# Patient Record
Sex: MALE | Race: WHITE | NOT HISPANIC OR LATINO
[De-identification: names, ages, dates, MRNs, and addresses within clinical notes are randomized per-mention and may not be internally consistent; named-entity substitution may affect disease eponyms.]

---

## 2017-01-23 ENCOUNTER — APPOINTMENT (OUTPATIENT)
Dept: SURGERY | Facility: CLINIC | Age: 67
End: 2017-01-23

## 2017-01-23 VITALS
BODY MASS INDEX: 25.77 KG/M2 | SYSTOLIC BLOOD PRESSURE: 130 MMHG | HEIGHT: 70 IN | HEART RATE: 71 BPM | TEMPERATURE: 98.2 F | DIASTOLIC BLOOD PRESSURE: 79 MMHG | WEIGHT: 180 LBS

## 2023-08-07 ENCOUNTER — NON-APPOINTMENT (OUTPATIENT)
Age: 73
End: 2023-08-07

## 2023-08-09 ENCOUNTER — APPOINTMENT (OUTPATIENT)
Dept: UROLOGY | Facility: CLINIC | Age: 73
End: 2023-08-09
Payer: MEDICARE

## 2023-08-09 VITALS
WEIGHT: 185 LBS | SYSTOLIC BLOOD PRESSURE: 114 MMHG | BODY MASS INDEX: 26.48 KG/M2 | HEIGHT: 70 IN | HEART RATE: 63 BPM | DIASTOLIC BLOOD PRESSURE: 71 MMHG | OXYGEN SATURATION: 95 %

## 2023-08-09 DIAGNOSIS — R35.1 NOCTURIA: ICD-10-CM

## 2023-08-09 PROCEDURE — 99204 OFFICE O/P NEW MOD 45 MIN: CPT

## 2023-08-09 NOTE — HISTORY OF PRESENT ILLNESS
[FreeTextEntry1] : NICOLASA OLIVEIRA is a 73 year M who presents today as a new patient evaluation for ED.   #ED Long standing history of ED DVUS by Dr Nieto 7/2023 - peak systolic flow 19 cm/sec. Erection 3/5 Testosterone 7/2023 - 582 HbA1c 6.3 Cr 0.99 PSA 3.31 Has been on trimix for 1.5 years. Has been increasing dose - last refill Trimix 30/6/140. He gave himself 90 units and he has only partial erection with this dose. Unable to have erection firm enough for penetration Not currently on any oral medications but has tried in past.  #LUTS Nocturia x 2 AUASS: 3/1/4/3/4/4/2 NOT on medications for urination   #PSA screening No MRI, biopsy of prostate No family history of prostate cancer  PSA 3.31 6/2023

## 2023-08-09 NOTE — PHYSICAL EXAM
[Normal Appearance] : normal appearance [Edema] : no peripheral edema [Abdomen Tenderness] : non-tender [Costovertebral Angle Tenderness] : no ~M costovertebral angle tenderness [Urethral Meatus] : meatus normal [Penis Abnormality] : normal circumcised penis [Testes Tenderness] : no tenderness of the testes [FreeTextEntry1] : prostate nodule right base [] : no rash [No Focal Deficits] : no focal deficits [No Palpable Adenopathy] : no palpable adenopathy

## 2023-08-09 NOTE — ASSESSMENT
[FreeTextEntry1] : 73 y.o. M with:  #Mildly elevated PSA, prostate nodule - We reviewed the use of prostate MRI and assessment of PSA elevation.  I explained to the patient that the MRI would be used both to assess an accurate prostate volume for calculation of PSA density and also be used to assess for any suspicious lesions within the prostate that may potentially harbor clinically significant prostate cancer.  I have explained to him that these parameters will be used to help guide recommendations to him about whether or not he should or should not consider undergoing prostate biopsy to further assess the PSA elevation and MRI findings.  He communicates his understanding and is in agreement to undergo the MRI imaging.  I will be in touch with him with that result once available. - prostate MRI   #LUTS - PVR 22 mL - Daily cialis as below - UA  #ED - Refractory to trimix - high dose (30/6/140). He injects up to 90 units - Had patient demonstrate technique with unfilled needle in office. He is injection correctly and reports minimal response - At this point, we discussed that IPP is likely his best option as he is unable to achieve erections satisfactory for penetration with ICI - Did discuss a trial of daily cialis IN ADDITION to ICI. He will try this - Discussed KAMRON device - has tried this - Discussed MUSE. Discussed side effects - patient not interested - Discussed emerging but limited data for LISWT - gave name of Dr Rutherford if interested  Will call with results of MRI

## 2023-08-21 LAB
APPEARANCE: CLEAR
BACTERIA: NEGATIVE /HPF
BILIRUBIN URINE: NEGATIVE
BLOOD URINE: NEGATIVE
CAST: 0 /LPF
COLOR: YELLOW
EPITHELIAL CELLS: 0 /HPF
GLUCOSE QUALITATIVE U: NEGATIVE MG/DL
KETONES URINE: NEGATIVE MG/DL
LEUKOCYTE ESTERASE URINE: NEGATIVE
MICROSCOPIC-UA: NORMAL
NITRITE URINE: NEGATIVE
PH URINE: 7.5
PROTEIN URINE: NEGATIVE MG/DL
RED BLOOD CELLS URINE: 0 /HPF
SPECIFIC GRAVITY URINE: 1.01
UROBILINOGEN URINE: 0.2 MG/DL
WHITE BLOOD CELLS URINE: 0 /HPF

## 2024-01-18 ENCOUNTER — APPOINTMENT (OUTPATIENT)
Dept: UROLOGY | Facility: CLINIC | Age: 74
End: 2024-01-18
Payer: MEDICARE

## 2024-01-18 VITALS
HEIGHT: 70 IN | WEIGHT: 185 LBS | BODY MASS INDEX: 26.48 KG/M2 | HEART RATE: 55 BPM | OXYGEN SATURATION: 97 % | RESPIRATION RATE: 16 BRPM | DIASTOLIC BLOOD PRESSURE: 62 MMHG | TEMPERATURE: 97.5 F | SYSTOLIC BLOOD PRESSURE: 110 MMHG

## 2024-01-18 DIAGNOSIS — N52.9 MALE ERECTILE DYSFUNCTION, UNSPECIFIED: ICD-10-CM

## 2024-01-18 DIAGNOSIS — N40.2 NODULAR PROSTATE W/OUT LOWER URINARY TRACT SYMPTOMS: ICD-10-CM

## 2024-01-18 DIAGNOSIS — Z12.5 ENCOUNTER FOR SCREENING FOR MALIGNANT NEOPLASM OF PROSTATE: ICD-10-CM

## 2024-01-18 PROCEDURE — 99214 OFFICE O/P EST MOD 30 MIN: CPT

## 2024-01-18 RX ORDER — TADALAFIL 5 MG/1
5 TABLET ORAL
Qty: 90 | Refills: 3 | Status: DISCONTINUED | COMMUNITY
Start: 2023-08-09 | End: 2024-01-18

## 2024-01-18 RX ORDER — TADALAFIL 20 MG/1
20 TABLET ORAL
Qty: 30 | Refills: 11 | Status: ACTIVE | COMMUNITY
Start: 2024-01-18 | End: 1900-01-01

## 2024-01-18 NOTE — HISTORY OF PRESENT ILLNESS
[FreeTextEntry1] : 73-year-old male presents for follow-up:  #PSA monitoring PSA June 2023: 3.31 He underwent a prostate MRI which demonstrated a 111 cc prostate, PI-RADS 3 lesion.  PSA density 0.03.  We discussed transperineal biopsy, which I recommended, but he preferred observation.   No family history of prostate cancer  #LUTS / BPH Was on daily tadalafil.  Most bothersome symptom is nocturia x 3-4, daytime frequency.  No gross hematuria, dysuria.  Minimally bothered by urination Regarding the tadalafil, reported that this worked well at first - but then the effect "wore off" and he stopped this due to lack of effect.  Prostate 111cc No UTIs, retention PVR today: 55 mL  #ED Longstanding history of ED Previously seen by Dr. Weinberg, underwent DVUS 7/2023 - peak systolic flow 19 cm/sec. Erection 3/5 Testosterone 7/2023 - 582 He has been on Trimix for the past 2 years -was as high as 30/6/140.  Was placed on quad mix at last visit Reports that even with quad mix, 40 units, he is unable to achieve an erection sufficient for penetration.  He can still orgasm.  At last visit, I had him show me his injection technique, which was adequate

## 2024-01-18 NOTE — PHYSICAL EXAM
[Normal Appearance] : normal appearance [Edema] : no peripheral edema [] : no respiratory distress [Bowel Sounds] : normal bowel sounds [Urethral Meatus] : meatus normal [Epididymis] : the epididymides were normal [Testes Tenderness] : no tenderness of the testes [Testes Mass (___cm)] : there were no testicular masses [Prostate Enlargement] : the prostate was not enlarged [Prostate Tenderness] : the prostate was not tender [No Prostate Nodules] : no prostate nodules [Skin Color & Pigmentation] : normal skin color and pigmentation

## 2024-01-18 NOTE — ASSESSMENT
[FreeTextEntry1] : 73 y.o. M with:  #BPH - Prostate 111 mL - PVR low -Not interested in additional medications.  Already tried daily tadalafil.  Discussed tamsulosin, finasteride.  He is worried about the side effects.  We discussed conservative measures, limiting fluids close to bedtime, double voiding, timed voiding, avoiding irritating fluids.  He will do this  #PSA screening - Last PSA 3.31. MRI with PIRADS 3 lesion - PSA density 0.03 -Discussed PI-RADS 3 lesions confer an approximate 15% chance of malignancy.  Did recommend transperineal biopsy.  He is inquiring if this is absolutely necessary.  Discussed that PSA monitoring is also an option.  He prefers to continue this approach.  Did discuss the risk of missing a malignancy.  I will call him with results of the PSA today  #ED - Did discuss a trial of prn cialis IN ADDITION to ICI. He will try this - Discussed KAMRON device - has tried this - Previously discussed LISWT - not interested - Refractory to high doses of quad mix.  Discussed at this point, he should seriously consider IPP.  This was discussed with him at length.  He is not ready to do this at this time

## 2024-01-22 LAB — PSA SERPL-MCNC: 2.77 NG/ML

## 2024-09-05 ENCOUNTER — APPOINTMENT (OUTPATIENT)
Dept: UROLOGY | Facility: CLINIC | Age: 74
End: 2024-09-05
Payer: MEDICARE

## 2024-09-05 DIAGNOSIS — Z12.5 ENCOUNTER FOR SCREENING FOR MALIGNANT NEOPLASM OF PROSTATE: ICD-10-CM

## 2024-09-05 DIAGNOSIS — R35.1 NOCTURIA: ICD-10-CM

## 2024-09-05 DIAGNOSIS — N52.9 MALE ERECTILE DYSFUNCTION, UNSPECIFIED: ICD-10-CM

## 2024-09-05 PROCEDURE — 99214 OFFICE O/P EST MOD 30 MIN: CPT

## 2024-09-05 PROCEDURE — G2211 COMPLEX E/M VISIT ADD ON: CPT

## 2024-09-05 RX ORDER — TADALAFIL 5 MG/1
5 TABLET ORAL
Qty: 90 | Refills: 3 | Status: ACTIVE | COMMUNITY
Start: 2024-09-05 | End: 1900-01-01

## 2024-09-05 RX ORDER — TADALAFIL 20 MG/1
20 TABLET ORAL
Qty: 20 | Refills: 6 | Status: ACTIVE | COMMUNITY
Start: 2024-09-05 | End: 1900-01-01

## 2024-09-05 NOTE — PHYSICAL EXAM
[Normal Appearance] : normal appearance [Edema] : no peripheral edema [] : no respiratory distress [Bowel Sounds] : normal bowel sounds [Abdomen Tenderness] : non-tender [Urethral Meatus] : meatus normal [Epididymis] : the epididymides were normal [Testes Mass (___cm)] : there were no testicular masses [Testes Tenderness] : no tenderness of the testes [Prostate Enlargement] : the prostate was not enlarged [No Prostate Nodules] : no prostate nodules [Prostate Tenderness] : the prostate was not tender

## 2024-09-05 NOTE — ASSESSMENT
[FreeTextEntry1] : 74 y.o. M with:  #BPH - Prostate 111 mL - PVR low - Not interested in additional medications.  We discussed conservative measures, limiting fluids close to bedtime, double voiding, timed voiding, avoiding irritating fluids.  He will do this  #PSA screening - Last PSA 3.02. MRI with PIRADS 3 lesion - PSA density 0.03 - Discussed PI-RADS 3 lesions confer an approximate 15% chance of malignancy.  Did recommend transperineal biopsy.  He is inquiring if this is absolutely necessary.  Discussed that PSA monitoring is also an option.  He prefers to continue this approach.  Did discuss the risk of missing a malignancy.   - q6 month PSA  #ED - s/p quadmix - Tried prn cialis, daily cialis - Previously discussed LISWT - not interested - Refractory to high doses of quad mix.  Discussed at this point, he should seriously consider IPP.  This was discussed with him at length.  He reports his wife had a stroke after knee implant and is concerned about surgery -Will retry daily Cialis, as needed Cialis and evening prior to sex.  If he uses 20 mg Cialis, should skip next morning daily dose  rpa 6 months

## 2024-09-05 NOTE — HISTORY OF PRESENT ILLNESS
[FreeTextEntry1] : 73-year-old male presents for follow-up:  #PSA monitoring PSA June 2023: 3.31 PSA Jan 2024: 2.77 PSA July 2024: 3.02 He underwent a prostate MRI which demonstrated a 111 cc prostate, PI-RADS 3 lesion.  PSA density 0.03.  We discussed transperineal biopsy, which I recommended, but he preferred observation.   No family history of prostate cancer  #LUTS / BPH Most bothersome symptom is nocturia every 1.5 hours (x4), daytime frequency - worsened w/ increased fluids. No gross hematuria, dysuria. Minimally bothered by urination Was on DAILY tadalafil - reported that this worked well at first - but then the effect "wore off" and he stopped this due to lack of effect.  Prostate 111cc No UTIs, retention pvr 33 mL  #ED Longstanding history of ED Previously seen by Dr. Weinberg, underwent DVUS 7/2023 - peak systolic flow 19 cm/sec. Erection 3/5 Testosterone 7/2023 - 582 Was on daily cialis, then PRN cialis, then trimix, then quadmix. Reports NOTHING works for him. Unable to achieve erection even w/ adequate injection techniques visit, I had him show me his injection technique, which was adequate Recommended IPP HbA1c 6.9%

## 2024-09-05 NOTE — PHYSICAL EXAM
[Normal Appearance] : normal appearance [Edema] : no peripheral edema [] : no respiratory distress [Bowel Sounds] : normal bowel sounds [Abdomen Tenderness] : non-tender [Urethral Meatus] : meatus normal [Epididymis] : the epididymides were normal [Testes Mass (___cm)] : there were no testicular masses [Testes Tenderness] : no tenderness of the testes [Prostate Enlargement] : the prostate was not enlarged [Prostate Tenderness] : the prostate was not tender [No Prostate Nodules] : no prostate nodules

## 2024-09-06 LAB
APPEARANCE: CLEAR
BACTERIA: NEGATIVE /HPF
BILIRUBIN URINE: NEGATIVE
BLOOD URINE: NEGATIVE
CAST: 0 /LPF
COLOR: YELLOW
EPITHELIAL CELLS: 0 /HPF
GLUCOSE QUALITATIVE U: >=1000 MG/DL
KETONES URINE: NEGATIVE MG/DL
LEUKOCYTE ESTERASE URINE: NEGATIVE
MICROSCOPIC-UA: NORMAL
NITRITE URINE: NEGATIVE
PH URINE: 5.5
PROTEIN URINE: NEGATIVE MG/DL
RED BLOOD CELLS URINE: 0 /HPF
SPECIFIC GRAVITY URINE: >1.03
UROBILINOGEN URINE: 0.2 MG/DL
WHITE BLOOD CELLS URINE: 0 /HPF

## 2024-12-26 ENCOUNTER — APPOINTMENT (OUTPATIENT)
Dept: MRI IMAGING | Facility: CLINIC | Age: 74
End: 2024-12-26
Payer: MEDICARE

## 2024-12-26 ENCOUNTER — APPOINTMENT (OUTPATIENT)
Dept: ORTHOPEDIC SURGERY | Facility: CLINIC | Age: 74
End: 2024-12-26
Payer: MEDICARE

## 2024-12-26 DIAGNOSIS — M79.641 PAIN IN RIGHT HAND: ICD-10-CM

## 2024-12-26 DIAGNOSIS — E11.9 TYPE 2 DIABETES MELLITUS W/OUT COMPLICATIONS: ICD-10-CM

## 2024-12-26 PROCEDURE — 73130 X-RAY EXAM OF HAND: CPT | Mod: RT

## 2024-12-26 PROCEDURE — 99203 OFFICE O/P NEW LOW 30 MIN: CPT

## 2024-12-26 PROCEDURE — 73218 MRI UPPER EXTREMITY W/O DYE: CPT | Mod: RT,MH

## 2024-12-30 ENCOUNTER — APPOINTMENT (OUTPATIENT)
Dept: ORTHOPEDIC SURGERY | Facility: CLINIC | Age: 74
End: 2024-12-30
Payer: MEDICARE

## 2024-12-30 DIAGNOSIS — S62.344A NONDISPLACED FRACTURE OF BASE OF FOURTH METACARPAL BONE, RIGHT HAND, INITIAL ENCOUNTER FOR CLOSED FRACTURE: ICD-10-CM

## 2024-12-30 PROCEDURE — 26600 TREAT METACARPAL FRACTURE: CPT | Mod: F8

## 2024-12-30 PROCEDURE — 99214 OFFICE O/P EST MOD 30 MIN: CPT | Mod: 57

## 2025-02-27 ENCOUNTER — APPOINTMENT (OUTPATIENT)
Dept: UROLOGY | Facility: CLINIC | Age: 75
End: 2025-02-27

## 2025-02-27 DIAGNOSIS — Z12.5 ENCOUNTER FOR SCREENING FOR MALIGNANT NEOPLASM OF PROSTATE: ICD-10-CM

## 2025-02-27 PROCEDURE — 99213 OFFICE O/P EST LOW 20 MIN: CPT

## 2025-02-28 LAB
PSA FREE FLD-MCNC: 34 %
PSA FREE SERPL-MCNC: 1.08 NG/ML
PSA SERPL-MCNC: 3.14 NG/ML

## 2025-05-06 ENCOUNTER — APPOINTMENT (OUTPATIENT)
Dept: NEUROLOGY | Facility: CLINIC | Age: 75
End: 2025-05-06
Payer: MEDICARE

## 2025-05-06 VITALS
SYSTOLIC BLOOD PRESSURE: 115 MMHG | DIASTOLIC BLOOD PRESSURE: 71 MMHG | HEIGHT: 70 IN | HEART RATE: 67 BPM | BODY MASS INDEX: 26.48 KG/M2 | WEIGHT: 185 LBS

## 2025-05-06 DIAGNOSIS — I65.29 OCCLUSION AND STENOSIS OF UNSPECIFIED CAROTID ARTERY: ICD-10-CM

## 2025-05-06 DIAGNOSIS — I63.511 CEREBRAL INFARCTION DUE TO UNSPECIFIED OCCLUSION OR STENOSIS OF RIGHT MIDDLE CEREBRAL ARTERY: ICD-10-CM

## 2025-05-06 PROCEDURE — 99205 OFFICE O/P NEW HI 60 MIN: CPT

## 2025-05-06 RX ORDER — ATORVASTATIN CALCIUM 40 MG/1
40 TABLET, FILM COATED ORAL
Refills: 0 | Status: ACTIVE | COMMUNITY

## 2025-05-06 RX ORDER — CLOPIDOGREL 75 MG/1
75 TABLET, FILM COATED ORAL
Refills: 0 | Status: ACTIVE | COMMUNITY

## 2025-05-06 RX ORDER — ROSUVASTATIN CALCIUM 10 MG/1
10 TABLET, FILM COATED ORAL
Refills: 0 | Status: ACTIVE | COMMUNITY

## 2025-05-06 RX ORDER — KRILL/OM-3/DHA/EPA/PHOSPHO/AST 1000-230MG
81 CAPSULE ORAL
Refills: 0 | Status: ACTIVE | COMMUNITY

## 2025-07-03 ENCOUNTER — APPOINTMENT (OUTPATIENT)
Dept: UROLOGY | Facility: CLINIC | Age: 75
End: 2025-07-03
Payer: MEDICARE

## 2025-07-03 PROCEDURE — G2211 COMPLEX E/M VISIT ADD ON: CPT

## 2025-07-03 PROCEDURE — 99214 OFFICE O/P EST MOD 30 MIN: CPT

## 2025-07-03 RX ORDER — SILDENAFIL 50 MG/1
50 TABLET ORAL
Qty: 30 | Refills: 1 | Status: ACTIVE | COMMUNITY
Start: 2025-07-03 | End: 1900-01-01

## 2025-07-03 RX ORDER — TADALAFIL 20 MG/1
20 TABLET ORAL
Qty: 20 | Refills: 3 | Status: ACTIVE | COMMUNITY
Start: 2025-07-03 | End: 1900-01-01